# Patient Record
(demographics unavailable — no encounter records)

---

## 2025-04-10 NOTE — ASSESSMENT
[Vaccines Reviewed] : Immunizations reviewed today. Please see immunization details in the vaccine log within the immunization flowsheet.  [FreeTextEntry1] : , , Preventative: Counseled on health promotion and disease prevention. EKG and wellness labs done Gastroenterology referral provided for colonoscopy Discussed routine immunizations  Heart Screen:  EKG nsr  HTN: Losartan  Obesity: Counseling on diet, exercise and lifestyle modifications. Weight loss goals discussed Nutritionist carlos    HLD: LDL = 121 Counseled on diet, exercise and lifestyle modifications. Advised a diet centered around whole plant based foods.  Vegetables, fruits, legumes, grains, nuts.  Advised limiting intake of refined processed foods (refined white flour products, refined sugar, soda, juice).  Limit intake of meat, dairy, eggs, oil.    Prediabetes: HbA1C % = 5.8 Counseled patient to cut down on processed sugar and carbohydrates. Increase Aerobic exercise and resistance training.

## 2025-04-10 NOTE — HISTORY OF PRESENT ILLNESS
[FreeTextEntry1] : PRASHANTH [de-identified] : He feels well and offers no acute concerns at this time.

## 2025-04-10 NOTE — HISTORY OF PRESENT ILLNESS
[FreeTextEntry1] : PRASHANTH [de-identified] : He feels well and offers no acute concerns at this time.